# Patient Record
Sex: FEMALE | Race: BLACK OR AFRICAN AMERICAN | NOT HISPANIC OR LATINO | URBAN - METROPOLITAN AREA
[De-identification: names, ages, dates, MRNs, and addresses within clinical notes are randomized per-mention and may not be internally consistent; named-entity substitution may affect disease eponyms.]

---

## 2020-03-25 ENCOUNTER — TELEPHONE (OUTPATIENT)
Dept: UROGYNECOLOGY | Age: 75
End: 2020-03-25

## 2020-03-26 ENCOUNTER — TELEPHONE (OUTPATIENT)
Dept: UROGYNECOLOGY | Age: 75
End: 2020-03-26

## 2020-03-30 ENCOUNTER — APPOINTMENT (OUTPATIENT)
Dept: UROGYNECOLOGY | Age: 75
End: 2020-03-30

## 2020-04-21 ENCOUNTER — APPOINTMENT (OUTPATIENT)
Dept: UROGYNECOLOGY | Age: 75
End: 2020-04-21

## 2020-05-26 ENCOUNTER — OFFICE VISIT (OUTPATIENT)
Dept: UROGYNECOLOGY | Age: 75
End: 2020-05-26

## 2020-05-26 DIAGNOSIS — N39.41 URGE INCONTINENCE OF URINE: ICD-10-CM

## 2020-05-26 DIAGNOSIS — N32.81 OAB (OVERACTIVE BLADDER): Primary | ICD-10-CM

## 2020-05-26 DIAGNOSIS — H53.8 BLURRY VISION, BILATERAL: ICD-10-CM

## 2020-05-26 LAB
APPEARANCE, POC: CLEAR
BILIRUBIN, POC: NEGATIVE
COLOR, POC: YELLOW
GLUCOSE UR-MCNC: NEGATIVE MG/DL
KETONES, POC: NEGATIVE
NITRITE, POC: NEGATIVE
OCCULT BLOOD, POC: NEGATIVE
PH UR: 6 [PH] (ref 5–7)
PROT UR-MCNC: NEGATIVE G/DL
SP GR UR: 1.01 (ref 1–1.03)
UROBILINOGEN UR-MCNC: 0.2 MG/DL (ref 0–1)
WBC (LEUKOCYTE) ESTERASE, POC: NEGATIVE

## 2020-05-26 PROCEDURE — 99204 OFFICE O/P NEW MOD 45 MIN: CPT | Performed by: OBSTETRICS & GYNECOLOGY

## 2020-05-26 PROCEDURE — 81003 URINALYSIS AUTO W/O SCOPE: CPT | Performed by: OBSTETRICS & GYNECOLOGY

## 2020-05-26 PROCEDURE — 51701 INSERT BLADDER CATHETER: CPT | Performed by: OBSTETRICS & GYNECOLOGY

## 2020-05-26 RX ORDER — OMEPRAZOLE 40 MG/1
CAPSULE, DELAYED RELEASE ORAL
COMMUNITY
Start: 2019-05-28

## 2020-05-26 RX ORDER — SIMVASTATIN 40 MG
40 TABLET ORAL DAILY
COMMUNITY
Start: 2020-02-14

## 2020-05-26 RX ORDER — AMLODIPINE BESYLATE AND BENAZEPRIL HYDROCHLORIDE 5; 10 MG/1; MG/1
1 CAPSULE ORAL DAILY
COMMUNITY
Start: 2020-02-14 | End: 2021-02-13

## 2020-05-26 RX ORDER — PROPRANOLOL HYDROCHLORIDE 160 MG/1
160 CAPSULE, EXTENDED RELEASE ORAL DAILY
COMMUNITY
Start: 2020-02-14

## 2020-05-26 RX ORDER — ERGOCALCIFEROL 1.25 MG/1
CAPSULE ORAL
COMMUNITY
Start: 2020-02-24

## 2020-05-26 RX ORDER — CARBAMAZEPINE 400 MG/1
400 TABLET, EXTENDED RELEASE ORAL
COMMUNITY
Start: 2020-02-14 | End: 2021-02-13

## 2020-05-26 RX ORDER — METHOCARBAMOL 750 MG/1
TABLET, FILM COATED ORAL
COMMUNITY
Start: 2020-05-22

## 2020-05-26 RX ORDER — OXYBUTYNIN CHLORIDE 10 MG/1
TABLET, EXTENDED RELEASE ORAL
COMMUNITY
Start: 2020-04-20 | End: 2020-06-22 | Stop reason: ALTCHOICE

## 2020-05-26 RX ORDER — HYDROCHLOROTHIAZIDE 12.5 MG/1
CAPSULE, GELATIN COATED ORAL
COMMUNITY
Start: 2020-02-14

## 2020-05-26 SDOH — HEALTH STABILITY: MENTAL HEALTH: HOW OFTEN DO YOU HAVE A DRINK CONTAINING ALCOHOL?: 2-4 TIMES A MONTH

## 2020-05-26 ASSESSMENT — PAIN SCALES - GENERAL: PAINLEVEL: 0

## 2020-05-26 ASSESSMENT — ENCOUNTER SYMPTOMS
EYE REDNESS: 0
SINUS PAIN: 0
WHEEZING: 0
DIZZINESS: 0
SPEECH CHANGE: 0
BRUISES/BLEEDS EASILY: 0
SORE THROAT: 0
WEIGHT LOSS: 0
DEPRESSION: 0
FEVER: 0
ROS GI COMMENTS: PER HPI
COUGH: 0
CHILLS: 0
BACK PAIN: 0
BLURRED VISION: 0

## 2020-05-26 ASSESSMENT — LIFESTYLE VARIABLES: SUBSTANCE_ABUSE: 0

## 2020-06-16 ENCOUNTER — TELEPHONE (OUTPATIENT)
Dept: UROGYNECOLOGY | Age: 75
End: 2020-06-16

## 2020-06-19 ENCOUNTER — E-ADVICE (OUTPATIENT)
Dept: UROGYNECOLOGY | Age: 75
End: 2020-06-19

## 2020-06-22 ENCOUNTER — OFFICE VISIT (OUTPATIENT)
Dept: UROGYNECOLOGY | Age: 75
End: 2020-06-22

## 2020-06-22 DIAGNOSIS — N39.41 URGE INCONTINENCE OF URINE: ICD-10-CM

## 2020-06-22 DIAGNOSIS — N32.81 OAB (OVERACTIVE BLADDER): Primary | ICD-10-CM

## 2020-06-22 LAB
APPEARANCE, POC: CLEAR
BILIRUBIN, POC: NEGATIVE
COLOR, POC: YELLOW
GLUCOSE UR-MCNC: NEGATIVE MG/DL
KETONES, POC: NEGATIVE
NITRITE, POC: NEGATIVE
OCCULT BLOOD, POC: NEGATIVE
PH UR: 5 [PH] (ref 5–7)
PROT UR-MCNC: NEGATIVE G/DL
SP GR UR: 1.01 (ref 1–1.03)
UROBILINOGEN UR-MCNC: 0.2 MG/DL (ref 0–1)
WBC (LEUKOCYTE) ESTERASE, POC: NEGATIVE

## 2020-06-22 PROCEDURE — 52287 CYSTOSCOPY CHEMODENERVATION: CPT | Performed by: OBSTETRICS & GYNECOLOGY

## 2020-06-22 PROCEDURE — 81003 URINALYSIS AUTO W/O SCOPE: CPT | Performed by: OBSTETRICS & GYNECOLOGY

## 2020-06-22 RX ORDER — NITROFURANTOIN 25; 75 MG/1; MG/1
100 CAPSULE ORAL ONCE
Status: COMPLETED | OUTPATIENT
Start: 2020-06-22 | End: 2020-06-22

## 2020-06-22 RX ORDER — CEPHALEXIN 250 MG/1
250 CAPSULE ORAL 3 TIMES DAILY
Qty: 9 CAPSULE | Refills: 0 | Status: SHIPPED | OUTPATIENT
Start: 2020-06-22 | End: 2020-06-25

## 2020-06-22 RX ADMIN — NITROFURANTOIN 100 MG: 25; 75 CAPSULE ORAL at 08:06

## 2020-06-22 ASSESSMENT — PAIN SCALES - GENERAL: PAINLEVEL: 0

## 2020-07-07 ENCOUNTER — TELEPHONE (OUTPATIENT)
Dept: UROGYNECOLOGY | Age: 75
End: 2020-07-07

## 2020-07-13 ENCOUNTER — E-ADVICE (OUTPATIENT)
Dept: UROGYNECOLOGY | Age: 75
End: 2020-07-13

## 2020-07-14 ENCOUNTER — OFFICE VISIT (OUTPATIENT)
Dept: UROGYNECOLOGY | Age: 75
End: 2020-07-14

## 2020-07-14 DIAGNOSIS — N39.41 URGE INCONTINENCE OF URINE: ICD-10-CM

## 2020-07-14 DIAGNOSIS — N32.81 OAB (OVERACTIVE BLADDER): Primary | ICD-10-CM

## 2020-07-14 LAB
APPEARANCE, POC: CLEAR
BILIRUBIN, POC: NEGATIVE
COLOR, POC: YELLOW
GLUCOSE UR-MCNC: NEGATIVE MG/DL
KETONES, POC: NEGATIVE
NITRITE, POC: NEGATIVE
OCCULT BLOOD, POC: NORMAL
PH UR: 5.5 [PH] (ref 5–7)
PROT UR-MCNC: NEGATIVE G/DL
SP GR UR: 1.02 (ref 1–1.03)
UROBILINOGEN UR-MCNC: 0.2 MG/DL (ref 0–1)
WBC (LEUKOCYTE) ESTERASE, POC: NEGATIVE

## 2020-07-14 PROCEDURE — 81003 URINALYSIS AUTO W/O SCOPE: CPT | Performed by: OBSTETRICS & GYNECOLOGY

## 2020-07-14 PROCEDURE — 99212 OFFICE O/P EST SF 10 MIN: CPT | Performed by: OBSTETRICS & GYNECOLOGY

## 2020-07-14 ASSESSMENT — PAIN SCALES - GENERAL: PAINLEVEL: 0

## 2021-03-10 DIAGNOSIS — Z23 NEED FOR VACCINATION: ICD-10-CM

## 2021-05-26 VITALS
RESPIRATION RATE: 17 BRPM | WEIGHT: 139 LBS | RESPIRATION RATE: 16 BRPM | WEIGHT: 139 LBS | BODY MASS INDEX: 27.29 KG/M2 | HEIGHT: 60 IN | DIASTOLIC BLOOD PRESSURE: 76 MMHG | RESPIRATION RATE: 16 BRPM | HEART RATE: 65 BPM | SYSTOLIC BLOOD PRESSURE: 124 MMHG | TEMPERATURE: 97.6 F | DIASTOLIC BLOOD PRESSURE: 70 MMHG | SYSTOLIC BLOOD PRESSURE: 110 MMHG | TEMPERATURE: 97.6 F | DIASTOLIC BLOOD PRESSURE: 73 MMHG | HEART RATE: 60 BPM | BODY MASS INDEX: 27.33 KG/M2 | SYSTOLIC BLOOD PRESSURE: 129 MMHG | HEART RATE: 76 BPM | BODY MASS INDEX: 27.29 KG/M2 | WEIGHT: 139.2 LBS | HEIGHT: 60 IN | TEMPERATURE: 97.4 F | HEIGHT: 60 IN

## 2021-07-16 ENCOUNTER — OFFICE VISIT (OUTPATIENT)
Dept: SURGERY | Facility: CLINIC | Age: 76
End: 2021-07-16
Payer: COMMERCIAL

## 2021-07-16 VITALS
OXYGEN SATURATION: 96 % | HEART RATE: 82 BPM | DIASTOLIC BLOOD PRESSURE: 66 MMHG | WEIGHT: 147.81 LBS | SYSTOLIC BLOOD PRESSURE: 118 MMHG | BODY MASS INDEX: 28.64 KG/M2 | HEIGHT: 60.3 IN

## 2021-07-16 DIAGNOSIS — E66.3 PATIENT OVERWEIGHT: ICD-10-CM

## 2021-07-16 DIAGNOSIS — K21.9 GASTROESOPHAGEAL REFLUX DISEASE, UNSPECIFIED WHETHER ESOPHAGITIS PRESENT: ICD-10-CM

## 2021-07-16 DIAGNOSIS — E55.9 VITAMIN D DEFICIENCY: ICD-10-CM

## 2021-07-16 DIAGNOSIS — R63.5 WEIGHT GAIN: ICD-10-CM

## 2021-07-16 DIAGNOSIS — I10 HYPERTENSION, UNSPECIFIED TYPE: Primary | ICD-10-CM

## 2021-07-16 PROCEDURE — 99204 OFFICE O/P NEW MOD 45 MIN: CPT | Performed by: NURSE PRACTITIONER

## 2021-07-16 PROCEDURE — 3008F BODY MASS INDEX DOCD: CPT | Performed by: NURSE PRACTITIONER

## 2021-07-16 PROCEDURE — 3078F DIAST BP <80 MM HG: CPT | Performed by: NURSE PRACTITIONER

## 2021-07-16 PROCEDURE — 3074F SYST BP LT 130 MM HG: CPT | Performed by: NURSE PRACTITIONER

## 2021-07-16 RX ORDER — PROPRANOLOL HYDROCHLORIDE 160 MG/1
CAPSULE, EXTENDED RELEASE ORAL
COMMUNITY
Start: 2021-05-28

## 2021-07-16 RX ORDER — HYDROCHLOROTHIAZIDE 12.5 MG/1
CAPSULE, GELATIN COATED ORAL DAILY
COMMUNITY
Start: 2021-05-21

## 2021-07-16 RX ORDER — OMEPRAZOLE 40 MG/1
40 CAPSULE, DELAYED RELEASE ORAL DAILY
COMMUNITY
Start: 2021-06-14

## 2021-07-16 RX ORDER — SIMVASTATIN 40 MG
TABLET ORAL
COMMUNITY
Start: 2021-05-28

## 2021-07-16 RX ORDER — ERGOCALCIFEROL 1.25 MG/1
50000 CAPSULE ORAL WEEKLY
COMMUNITY
Start: 2021-06-26

## 2021-07-16 RX ORDER — GABAPENTIN 300 MG/1
CAPSULE ORAL AS NEEDED
COMMUNITY
Start: 2021-07-14

## 2021-07-16 RX ORDER — AMLODIPINE BESYLATE AND BENAZEPRIL HYDROCHLORIDE 5; 10 MG/1; MG/1
1 CAPSULE ORAL DAILY
COMMUNITY
Start: 2021-05-03

## 2021-07-16 RX ORDER — DICYCLOMINE HYDROCHLORIDE 10 MG/1
CAPSULE ORAL AS NEEDED
COMMUNITY
Start: 2021-06-25

## 2021-07-16 NOTE — PATIENT INSTRUCTIONS
Magnesium glycinate for sleep:  200 to 400 mg with dinner. NOW. Natural Calm   by [employer]  Follow dosage instructions  Start 1 teaspoon and increase up to 3 teaspoons as needed for sleep    Garden of Life- probiotic daily.      Psyllium h drink.  6. Find a regular pattern for meals. Aim for 3 healthy meals a day with 1-2 healthy snacks. 7. Plan when, where, and what you will eat at each meal in advance to make sure you do not go too many hours without eating.    8. Include lean protein thr

## 2021-07-16 NOTE — PROGRESS NOTES
[facility]ation [facility]       Date of Consult:  2021    Patient:  Willi Walker  :      2/15/1945  MRN:      [id number]    Referring Provider: Patient of clinic     Chief Complaint:  Patient presents with:  Sandeep Schultz Comorbidities:  HTN, GERD    Social History:  Social History    Socioeconomic History      Marital status: Unknown      Spouse name: Not on file      Number of children: Not on file      Years of education: Not on file      Highest education level: Not 7-  Emotional: -  Depression: PHQ Score: 0  Grazing: -  Sweet tooth: at times   Crunchy/salty: at times   Etoh: Once/week   Soda Drinker: Yes  If yes, how much?:  Diet pepsi very occasional   Sports Drinks:  No    Juice:  Yes  If yes, how much?:  8 oz OJ o diagnosis)  Weight gain  Patient overweight  Vitamin D deficiency  Gastroesophageal reflux disease, unspecified whether esophagitis present    PLAN       Diagnoses and all orders for this visit:    Hypertension, unspecified type  -     OP REFERRAL JUMP STA medications for weight loss at this time and implement lifestyle and behavioral modifications for weight loss. Discussed medications that can contribute toward weight gain- gabapentin, propranolol. Consider topiramate.      Start magnesium to improve

## 2021-07-29 ENCOUNTER — TELEPHONE (OUTPATIENT)
Dept: ENDOCRINOLOGY | Facility: HOSPITAL | Age: 76
End: 2021-07-29

## 2021-07-29 NOTE — TELEPHONE ENCOUNTER
Left message for pt re: Jumpstart referral from Dr. Dany Chawla. Left # for class registration and dates/times of upcoming information sessions.

## 2024-12-30 ENCOUNTER — APPOINTMENT (OUTPATIENT)
Dept: UROGYNECOLOGY | Age: 79
End: 2024-12-30

## 2024-12-30 VITALS
HEIGHT: 59 IN | HEART RATE: 71 BPM | TEMPERATURE: 97.3 F | RESPIRATION RATE: 16 BRPM | WEIGHT: 144.4 LBS | SYSTOLIC BLOOD PRESSURE: 122 MMHG | BODY MASS INDEX: 29.11 KG/M2 | DIASTOLIC BLOOD PRESSURE: 64 MMHG

## 2024-12-30 DIAGNOSIS — N32.81 OAB (OVERACTIVE BLADDER): Primary | ICD-10-CM

## 2024-12-30 DIAGNOSIS — N39.41 URGE INCONTINENCE OF URINE: ICD-10-CM

## 2024-12-30 LAB
APPEARANCE, POC: CLEAR
BILIRUB UR QL STRIP: NEGATIVE
COLOR UR: YELLOW
GLUCOSE UR-MCNC: NEGATIVE MG/DL
HGB UR QL STRIP: NEGATIVE
KETONES UR STRIP-MCNC: NEGATIVE MG/DL
LEUKOCYTE ESTERASE UR QL STRIP: NEGATIVE
NITRITE UR QL STRIP: NEGATIVE
PH UR: 5.5 [PH] (ref 5–7)
PROT UR-MCNC: NEGATIVE MG/DL
SP GR UR: 1.02 (ref 1–1.03)
UROBILINOGEN UR-MCNC: 0.2 MG/DL (ref 0–1)

## 2024-12-30 PROCEDURE — 81003 URINALYSIS AUTO W/O SCOPE: CPT | Performed by: OBSTETRICS & GYNECOLOGY

## 2024-12-30 PROCEDURE — 99203 OFFICE O/P NEW LOW 30 MIN: CPT | Performed by: OBSTETRICS & GYNECOLOGY

## 2024-12-30 RX ORDER — TRAMADOL HYDROCHLORIDE 50 MG/1
TABLET ORAL
COMMUNITY
Start: 2024-11-23

## 2024-12-30 RX ORDER — DICLOFENAC SODIUM 75 MG/1
1 TABLET, DELAYED RELEASE ORAL 2 TIMES DAILY
COMMUNITY
Start: 2024-11-13 | End: 2025-11-13

## 2024-12-30 RX ORDER — ALPRAZOLAM 0.5 MG
TABLET ORAL
COMMUNITY
Start: 2024-08-15

## 2024-12-30 RX ORDER — METHYLPREDNISOLONE 4 MG/1
TABLET ORAL
COMMUNITY
Start: 2024-08-29

## 2024-12-30 RX ORDER — MONTELUKAST SODIUM 10 MG/1
10 TABLET ORAL DAILY
COMMUNITY
Start: 2024-11-13

## 2024-12-30 RX ORDER — PREGABALIN 150 MG/1
150 CAPSULE ORAL 2 TIMES DAILY
COMMUNITY

## 2024-12-30 RX ORDER — TRAZODONE HYDROCHLORIDE 50 MG/1
50 TABLET, FILM COATED ORAL AT BEDTIME
COMMUNITY

## 2024-12-30 ASSESSMENT — PAIN SCALES - GENERAL: PAINLEVEL: 0

## 2025-01-03 ENCOUNTER — APPOINTMENT (OUTPATIENT)
Dept: UROGYNECOLOGY | Age: 80
End: 2025-01-03

## 2025-03-18 ENCOUNTER — APPOINTMENT (OUTPATIENT)
Dept: UROGYNECOLOGY | Age: 80
End: 2025-03-18

## 2025-03-18 VITALS
HEIGHT: 59 IN | BODY MASS INDEX: 30.16 KG/M2 | SYSTOLIC BLOOD PRESSURE: 125 MMHG | WEIGHT: 149.6 LBS | RESPIRATION RATE: 16 BRPM | HEART RATE: 64 BPM | TEMPERATURE: 96.1 F | DIASTOLIC BLOOD PRESSURE: 66 MMHG

## 2025-03-18 DIAGNOSIS — N32.81 OAB (OVERACTIVE BLADDER): Primary | ICD-10-CM

## 2025-03-18 DIAGNOSIS — Z79.2 PROPHYLACTIC ANTIBIOTIC: ICD-10-CM

## 2025-03-18 DIAGNOSIS — N39.41 URGE INCONTINENCE OF URINE: ICD-10-CM

## 2025-03-18 LAB
APPEARANCE, POC: CLEAR
BILIRUB UR QL STRIP: NEGATIVE
COLOR UR: YELLOW
GLUCOSE UR-MCNC: NEGATIVE MG/DL
HGB UR QL STRIP: NEGATIVE
KETONES UR STRIP-MCNC: NEGATIVE MG/DL
LEUKOCYTE ESTERASE UR QL STRIP: NEGATIVE
NITRITE UR QL STRIP: NEGATIVE
PH UR: 5.5 [PH] (ref 5–7)
PROT UR-MCNC: NEGATIVE MG/DL
SP GR UR: 1.01 (ref 1–1.03)
UROBILINOGEN UR-MCNC: 0.2 MG/DL (ref 0–1)

## 2025-03-18 RX ORDER — PREDNISONE 10 MG/1
TABLET ORAL
COMMUNITY
Start: 2025-01-16

## 2025-03-18 RX ORDER — SCOPOLAMINE 1 MG/3D
1 PATCH, EXTENDED RELEASE TRANSDERMAL
COMMUNITY
Start: 2025-02-25 | End: 2025-03-27

## 2025-03-18 RX ADMIN — Medication 250 MG: at 10:04

## 2025-03-18 ASSESSMENT — PAIN SCALES - GENERAL: PAINLEVEL: 0

## 2025-09-23 ENCOUNTER — APPOINTMENT (OUTPATIENT)
Dept: UROGYNECOLOGY | Age: 80
End: 2025-09-23